# Patient Record
Sex: FEMALE | Race: WHITE | NOT HISPANIC OR LATINO | Employment: OTHER | URBAN - METROPOLITAN AREA
[De-identification: names, ages, dates, MRNs, and addresses within clinical notes are randomized per-mention and may not be internally consistent; named-entity substitution may affect disease eponyms.]

---

## 2014-05-27 LAB — HIV 1&2 EXT: NORMAL

## 2020-08-27 LAB
CREATININE (EXTERNAL): 0.73 MG/DL (ref 0.55–1.02)
GFR ESTIMATED (EXTERNAL): >60 ML/MIN
GFR ESTIMATED (IF AFRICAN AMERICAN) (EXTERNAL): >60 ML/MIN
GLUCOSE (EXTERNAL): 97 MG/DL (ref 74–106)
POTASSIUM (EXTERNAL): 4.7 MMOL/L (ref 3.5–5.1)
TSH SERPL-ACNC: 0.55 UIU/ML (ref 0.36–3.74)

## 2021-02-13 LAB
CHOLESTEROL (EXTERNAL): 207 MG/DL (ref 0–199)
HDLC SERPL-MCNC: 59 MG/DL
LDL CHOLESTEROL CALCULATED (EXTERNAL): 128 MG/DL
NON HDL CHOLESTEROL (EXTERNAL): 148 MG/DL
TRIGLYCERIDES (EXTERNAL): 99 MG/DL

## 2021-04-20 ENCOUNTER — TRANSFERRED RECORDS (OUTPATIENT)
Dept: MULTI SPECIALTY CLINIC | Facility: CLINIC | Age: 56
End: 2021-04-20

## 2021-04-20 LAB — HPV ABSTRACT: NORMAL

## 2023-07-19 ENCOUNTER — TELEPHONE (OUTPATIENT)
Dept: OBGYN | Facility: CLINIC | Age: 58
End: 2023-07-19
Payer: COMMERCIAL

## 2023-07-19 NOTE — TELEPHONE ENCOUNTER
Reason for Call:  Appointment Request    Patient requesting this type of appt:  Office visit    Requested provider: Bridgette Canales    Reason patient unable to be scheduled: Not within requested timeframe    When does patient want to be seen/preferred time: 1-2 days    Comments: patient is in menopause has not had a period in 4 years now has a period and is requesting to be seen this week with Dr. Ally Lundy to leave a detailed message?: Yes at Home number on file [phone] (home)    Call taken on 7/19/2023 at 12:28 PM by Genevieve Lopez

## 2023-07-19 NOTE — TELEPHONE ENCOUNTER
Left message for patient to return call.   Dr. Canales is not in clinic for the rest of this week, she will be back on 7/26 and she does not currently have any openings.  Patient can see any other provider in group and be placed on wait list. Soonest opening would be 8/2 at 4:30 with Dr. Alex in [place], or 8/1 with Dr. Mercer in [place]    Reyna Campoverde MA

## 2023-07-21 ENCOUNTER — NURSE TRIAGE (OUTPATIENT)
Dept: OBGYN | Facility: OTHER | Age: 58
End: 2023-07-21
Payer: COMMERCIAL

## 2023-07-21 NOTE — TELEPHONE ENCOUNTER
Reports light spotting X 5 days with mild menstrual like cramping. Denies coitus, recent procedures or injury.      Patient has appointment scheduled to be seen for post menopausal bleeding 8/01/2023.  Patient knows to call back with any new or worsening symptoms prior to appointment.  ED precautions given.     Reason for Disposition    Postmenopausal vaginal bleeding    Additional Information    Negative: Shock suspected (e.g., cold/pale/clammy skin, too weak to stand, low BP, rapid pulse)    Negative: Difficult to awaken or acting confused (e.g., disoriented, slurred speech)    Negative: Passed out (i.e., lost consciousness, collapsed and was not responding)    Negative: Sounds like a life-threatening emergency to the triager    Negative: [1] Vaginal discharge is main symptom AND [2] small amount of blood    Negative: Followed a genital area injury (e.g., vagina, vulva)    Negative: SEVERE abdominal pain    Negative: SEVERE dizziness (e.g., unable to stand, requires support to walk, feels like passing out now)    Negative: Sexual assault or rape (sexual intercourse or activity occurs without freely given consent), known or suspected    Negative: SEVERE vaginal bleeding (e.g., soaking 2 pads or tampons per hour and present 2 or more hours; 1 menstrual cup every 2 hours)    Negative: Patient sounds very sick or weak to the triager    Negative: MODERATE vaginal bleeding (e.g., soaking 1 pad or tampon per hour and present > 6 hours; 1 menstrual cup every 6 hours)    Negative: Pale skin (pallor) of new-onset or worsening    Negative: [1] Constant abdominal pain AND [2] present > 2 hours    Negative: Taking Coumadin (warfarin) or other strong blood thinner, or known bleeding disorder (e.g., thrombocytopenia)    Negative: Bleeding with > 6 soaked pads or tampons per day    Negative: Bleeding lasts for > 7 days    Negative: [1] Bleeding/spotting after procedure (e.g., biopsy) or pelvic examination (e.g., pap smear) AND  "[2] lasts > 7 days    Answer Assessment - Initial Assessment Questions  1. AMOUNT: \"Describe the bleeding that you are having.\" \"How much bleeding is there?\"     - SPOTTING: spotting, or pinkish / brownish mucous discharge; does not fill panty liner or pad     - MILD:  less than 1 pad / hour; less than patient's usual menstrual bleeding    - MODERATE: 1-2 pads / hour; 1 menstrual cup every 6 hours; small-medium blood clots (e.g., pea, grape, small coin)    - SEVERE: soaking 2 or more pads/hour for 2 or more hours; 1 menstrual cup every 2 hours; bleeding not contained by pads or continuous red blood from vagina; large blood clots (e.g., golf ball, large coin)       Spotting X 5 days    2. ONSET: \"When did the bleeding begin?\" \"Is it continuing now?\"      Monday 7/17/2023    3. MENOPAUSE: \"When was your last menstrual period?\"       3 or 4 years ago     4. ABDOMINAL PAIN: \"Do you have any pain?\" \"How bad is the pain?\"  (e.g., Scale 1-10; mild, moderate, or severe)    - MILD (1-3): doesn't interfere with normal activities, abdomen soft and not tender to touch     - MODERATE (4-7): interferes with normal activities or awakens from sleep, abdomen tender to touch     - SEVERE (8-10): excruciating pain, doubled over, unable to do any normal activities       Mild menstrual cramps    5. BLOOD THINNERS: \"Do you take any blood thinners?\" (e.g., Coumadin/warfarin, Pradaxa/dabigatran, aspirin)      No     6. HORMONES: \"Are you taking any hormone medications, prescription or OTC?\" (e.g., birth control pills, estrogen)      No     7. CAUSE: \"What do you think is causing the bleeding?\" (e.g., recent gyn surgery, recent gyn procedure; known bleeding disorder, uterine cancer)        No     8. HEMODYNAMIC STATUS: \"Are you weak or feeling lightheaded?\" If Yes, ask: \"Can you stand and walk normally?\"        Denies    9. OTHER SYMPTOMS: \"What other symptoms are you having with the bleeding?\" (e.g., back pain, burning with urination, " fever)      Increased mild fatigue    Protocols used: VAGINAL BLEEDING - [id number]

## 2023-07-21 NOTE — TELEPHONE ENCOUNTER
"[facility] Call Center    Phone Message    May a detailed message be left on voicemail: yes     Reason for Call: Other:        Sheryl is scheduled to see Dr Mercer on 8/1/23.   She reports that she is in menopause and has not had a period in 4 years.   Now she has her period.  She reports that she has googled what this could be and in concerned as \"none of the answers I am finding are good\"       Sheryl would like to speak with a nurse to make sure that it is ok for her to wait until 8/1/23 to be seen.            Action Taken: Message routed to:  [facility] [id number]    Travel Screening: Not Applicable                                                                      "

## 2023-07-21 NOTE — TELEPHONE ENCOUNTER
Unable to reach patient via phone. RN left a message and instructed patient to call the clinic at [phone].    To triage for post menopausal bleeding.    Lorrie Mcgowan RN on 7/21/2023 at 1:10 PM

## 2023-08-01 ENCOUNTER — ANCILLARY PROCEDURE (OUTPATIENT)
Dept: ULTRASOUND IMAGING | Facility: OTHER | Age: 58
End: 2023-08-01
Attending: OBSTETRICS & GYNECOLOGY
Payer: COMMERCIAL

## 2023-08-01 ENCOUNTER — OFFICE VISIT (OUTPATIENT)
Dept: OBGYN | Facility: OTHER | Age: 58
End: 2023-08-01
Payer: COMMERCIAL

## 2023-08-01 VITALS — DIASTOLIC BLOOD PRESSURE: 87 MMHG | SYSTOLIC BLOOD PRESSURE: 129 MMHG | WEIGHT: 178.57 LBS

## 2023-08-01 DIAGNOSIS — N95.0 POST-MENOPAUSAL BLEEDING: Primary | ICD-10-CM

## 2023-08-01 DIAGNOSIS — N95.0 POST-MENOPAUSAL BLEEDING: ICD-10-CM

## 2023-08-01 PROCEDURE — 76856 US EXAM PELVIC COMPLETE: CPT | Mod: TC | Performed by: RADIOLOGY

## 2023-08-01 PROCEDURE — 99203 OFFICE O/P NEW LOW 30 MIN: CPT | Performed by: OBSTETRICS & GYNECOLOGY

## 2023-08-01 PROCEDURE — 76830 TRANSVAGINAL US NON-OB: CPT | Mod: TC | Performed by: RADIOLOGY

## 2023-08-01 RX ORDER — ZINC GLUCONATE 50 MG
50 TABLET ORAL DAILY
COMMUNITY

## 2023-08-01 NOTE — PROGRESS NOTES
SUBJECTIVE:       I spent a total of 30 minutes on the care of Sheryl on the day of service including 25 minutes of face-to-face time with remainder in chart review, care coordination, documentation on the day of service.                                                Sheryl Castañeda is a 58 year old female who presents to clinic today for the following health issue(s):  No chief complaint on file.    Sheryl is a 58-year-old P2 who has been menopausal for 4 years presenting with 5 days of bleeding and then post bleeding cramping.  She notes it came on spontaneously and was light in consistency but the Cramping has persisted and tends to radiate down her left leg.    She has been menopausal for 4 years and had an uncomplicated run up to her menopause.  Her periods have always been normal.  She has not been excessively sexually active recently and none in the recent several weeks.  Significant other sounds like she is a on the  and unavailable most of the time during the summer months.    Review of systems:  She has a negative review of systems for gynecological issues no rashes, itching, burning, discharge, odors or irregular bleeding leading up to this event.  Urological review of systems is negative for frequency nocturia dysuria  GI review of systems is negative.    Medications:  Negative    Social history:  She has a substantial amount of stress most recently mostly to do with her elder child which is her daughter and then granddaughter as result of that relationship.  There is also a mother who has had some substantial bowel resection of tumor.  And then more recently something that was related to infection of the parotid gland.    Family history:  There is a history of colon cancer that runs in the family.    Past OB history:  She has had 2 kids the first was by  that is her girl she is 28 years of age.  Second child was a vaginal birth and that is her son who is 25 years of age and has not  had any significant social issues.    I drew diagrams of the uterus and explained the significance of postmenopausal bleeding and the differential diagnosis for that.  We talked about evaluation of postmenopausal bleeding and what should be done.  Suggest that we start with an ultrasound and see if there is any anatomical issues related to the uterus and depending on the endometrial lining decide on whether an endometrial sample was pertinent.  Also suggest that if we do an endometrial sample would schedule her back in the clinic in 1 week's time and take ibuprofen prior to the sample.  With the left-sided cramping in the leg issues suggest that she try 2 weeks worth of ibuprofen 600 mg at least twice a day.  She does to MyChart.    Examination is deferred    Assessment 58-year-old healthy with social stressors and now more recently postmenopausal bleeding.  She has negative risk factors for uterine issues and an uncomplicated GYN history.    Plan:  Pelvic ultrasound is pending from today  Endometrial sample would be planned depending on the end ultrasound report  Advised to try ibuprofen at least 60 mg twice a day for the next week  Follow-up in a week's time               No LMP recorded..     Patient is sexually active, No obstetric history on file..  Using menopause for contraception.    has no history on file for tobacco use.    STD testing offered?  Declined    Health maintenance updated:  no    Today's PHQ-2 Score:        No data to display              Today's PHQ-9 Score:        No data to display              Today's NE-7 Score:        No data to display                Problem list and histories reviewed & adjusted, as indicated.  Additional history: as documented.    There is no problem list on file for this patient.    No past surgical history on file.   Social History     Tobacco Use    Smoking status: Not on file    Smokeless tobacco: Not on file   Substance Use Topics    Alcohol use: Not on file       No data available              No current outpatient medications on file.     No current facility-administered medications for this visit.     Not on File            OBJECTIVE:     There were no vitals taken for this visit.  There is no height or weight on file to calculate BMI.    Exam:  See above    In-Clinic Test Results:  No results found for this or any previous visit (from the past 24 hour(s)).    ASSESSMENT/PLAN:                                                    See above    Patient Instructions                                                      If you have any questions regarding your visit, Please contact your care team.     [employer] [facility]: [phone]    To Schedule an Appointment 24/7  Call: [phone] HOURS TELEPHONE NUMBER     Christiano Mercer MD    Medical Assistant    Jacquelin Cifuentes-Surgery Scheduler  Sera-Surgery Scheduler     [place]  1:00 pm-4:30 pm    [facility]4:30 [facility]    [facility]  7:30 am-4:30 pm        Typical Surgery Days: Monday or Thursday       [address] [address]  [phone] appointment line  [phone] Fax    Imaging Scheduling all locations  [phone]    **Surgeries** Our Surgery Schedulers will contact you to schedule. If you do not receive a call within 3 business days, please call [phone].    If you need a medication refill, please contact your pharmacy. Please allow 3 business days for your refill to be completed.    As always, Thank you for trusting us with your healthcare needs!                   see additional instructions from your care team below          Christiano Mercer MD  [facility]

## 2023-08-01 NOTE — PATIENT INSTRUCTIONS
If you have any questions regarding your visit, Please contact your care team.     [email or web address] [facility]: [phone]    To Schedule an Appointment 24/7  Call: [phone] TELE[facility]ONE NUM[facility]     Christiano Mercer MD    Medical Assistant    Jacquelin Cifuentes-Surgery Scheduler  Sera-Surgery Scheduler     Monday-[place]  1:00 pm-4:30 pm    Tuesday-[place]  7:30 am-4:30 pm    Wednesday-[place]  7:30 am-4:30 pm        Typical Surgery Days: Monday or Thursday       [address] [address]  [phone] appointment line  [phone] Fax    Imaging Scheduling all locations  [phone]    **Surgeries** Our Surgery Schedulers will contact you to schedule. If you do not receive a call within 3 business days, please call [phone].    If you need a medication refill, please contact your pharmacy. Please allow 3 business days for your refill to be completed.    As always, Thank you for trusting us with your healthcare needs!                   see additional instructions from your care team below

## 2023-08-08 ENCOUNTER — OFFICE VISIT (OUTPATIENT)
Dept: OBGYN | Facility: OTHER | Age: 58
End: 2023-08-08
Payer: COMMERCIAL

## 2023-08-08 VITALS — HEART RATE: 86 BPM | WEIGHT: 178.57 LBS | DIASTOLIC BLOOD PRESSURE: 81 MMHG | SYSTOLIC BLOOD PRESSURE: 131 MMHG

## 2023-08-08 DIAGNOSIS — N95.0 POST-MENOPAUSAL BLEEDING: Primary | ICD-10-CM

## 2023-08-08 PROCEDURE — 88305 TISSUE EXAM BY PATHOLOGIST: CPT | Performed by: PATHOLOGY

## 2023-08-08 PROCEDURE — 58100 BIOPSY OF UTERUS LINING: CPT | Performed by: OBSTETRICS & GYNECOLOGY

## 2023-08-08 NOTE — PROGRESS NOTES
INDICATIONS:                                                      Sheryl is a 58 year old P2 who has an US with 7 mm lining and post menopausal bleeding.  Suggested to have an endometrial biopsy and plan/options discussed.  We discussed the option of waiting and having an US in 6 months to reevaluate the lining of the uterus, Having a D and C hysteroscopy and third option of being on progesterone for the next 6 months followed by US.  She already has cramping (planning for a colonoscopy).  With the cramps I'd be less likely to suggest progesterone.    Is a pregnancy test required: No.  Was a consent obtained?  Yes    Having endometrial biopsy for post-menopausal bleeding and thickened endometrium     Today's PHQ-2 Score:       8/1/2023     8:35 AM   PHQ-2 ( 1999 [employer])   Q1: Little interest or pleasure in doing things 0   Q2: Feeling down, depressed or hopeless 0   PHQ-2 Score 0       PROCEDURE;                                                      A speculum was placed in the vagina and cervix prepped with betadine. A tenaculum was attached to the cervix. A small plastic 5 mm Pipelle syringe curette was inserted into the cervical canal. The uterus was sounded to 8 cm's. A vigorous four quadrant biopsy was performed, removing amount moderate  of tissue. The speculum was removed. This tissue was placed in Formalin and sent to pathology.    The patient tolerated the procedure  fairly well and she reported there was  cramping.      POST PROCEDURE;                                                      There  was  cramping at the time of discharge. She  tolerated the procedure well. There were no complications. Patient was discharged in stable condition.    Patient advised to call the clinic if severe pelvic pain, fever or heavy bleeding.    Christiano Mercer MD

## 2023-08-08 NOTE — PATIENT INSTRUCTIONS
If you have any questions regarding your visit, Please contact your care team.     [employer] [facility]: [phone]    To Schedule an Appointment 24/7  Call: [phone] HOURS TELEPHONE NUMBER     Christiano Mercer MD    Medical Assistant    Jacquelin Cifuentes-Surgery Scheduler  Sera-Surgery Scheduler     Monday-[place]  1:00 pm-4:30 pm    Tuesday-[place]  7:30 am-4:30 pm    Wednesday-[place]  7:30 am-4:30 pm        Typical Surgery Days: Monday or Thursday       [address] [address]  [phone] appointment line  [phone] Fax    Imaging Scheduling all locations  [phone]    **Surgeries** Our Surgery Schedulers will contact you to schedule. If you do not receive a call within 3 business days, please call [phone].    If you need a medication refill, please contact your pharmacy. Please allow 3 business days for your refill to be completed.    As always, Thank you for trusting us with your healthcare needs!                   see additional instructions from your care team below

## 2023-08-11 LAB
PATH REPORT.COMMENTS IMP SPEC: NORMAL
PATH REPORT.COMMENTS IMP SPEC: NORMAL
PATH REPORT.FINAL DX SPEC: NORMAL
PATH REPORT.GROSS SPEC: NORMAL
PATH REPORT.MICROSCOPIC SPEC OTHER STN: NORMAL
PATH REPORT.RELEVANT HX SPEC: NORMAL
PHOTO IMAGE: NORMAL

## 2023-08-15 ENCOUNTER — HOSPITAL ENCOUNTER (OUTPATIENT)
Dept: MAMMOGRAPHY | Facility: CLINIC | Age: 58
Discharge: HOME | End: 2023-08-15
Attending: OBSTETRICS & GYNECOLOGY | Admitting: OBSTETRICS & GYNECOLOGY
Payer: COMMERCIAL

## 2023-08-15 DIAGNOSIS — Z12.31 VISIT FOR SCREENING MAMMOGRAM: ICD-10-CM

## 2023-08-15 PROCEDURE — 77067 SCR MAMMO BI INCL CAD: CPT

## 2023-08-21 ENCOUNTER — TELEPHONE (OUTPATIENT)
Dept: OBGYN | Facility: OTHER | Age: 58
End: 2023-08-21
Payer: COMMERCIAL

## 2023-08-21 NOTE — TELEPHONE ENCOUNTER
Patient called earlier today to speak to Dr. Hendricks RN, was initially transferred and patient said she was disconnected.     Returning call requesting to be connected again. Informed patient the team is currently busy but will return her call when they are able.      Routing encounter to OB team. Please reach out to patient when available.

## 2023-08-21 NOTE — TELEPHONE ENCOUNTER
"Pt last seen 8/8/2023 for PMB- pt completed EMB: \" With the lining being a little thicker than normal I'd suggest having a repeat US in 6 months.  I put in an order for that already.  Another option is to have a D and C hysteroscopy to clean out the extra thickness. It's your call.  I don't think there is any harm in waiting and if you have odd bleeding again in the future, that would be a good enough reason to have the D and C. \"    Unable to reach patient via phone. RN left a message and instructed patient to call the clinic at [phone].    Irma Birch RN on 8/21/2023 at 3:08 PM    "

## 2023-08-22 NOTE — TELEPHONE ENCOUNTER
"Spoke with pt.  She has questions about her EMB results from 8/8.  Per EMB result note:  \"The endometrial sample looks just fine.  We talked about what to do in the future.  With the lining being a little thicker than normal I'd suggest having a repeat US in 6 months.  I put in an order for that already.  Another option is to have a D and C hysteroscopy to clean out the extra thickness. It's your call.  I don't think there is any harm in waiting and if you have odd bleeding again in the future, that would be a good enough reason to have the D and C.\"    Pt states she was contacted to schedule an US but pt states she already did the US.  I told her that based on Dr. Mercer's result note he had suggested to repeat the US in 6 months.  Pt is wanting to know what she can do now for her symptoms.  She states she is experiencing lower abdominal cramping and feels pregnant.  I mentioned that Dr. Mercer suggested she do an hysteroscopy D&C to clean out the extra thickness to see if that helps her symptoms.  I also suggested that her symptoms could be related to something other than gynecologic in nature.      Pt will think about what she wants to do and get back to us.  She does have a colonoscopy scheduled for September.    Shelby Marquez RN    "

## 2023-08-27 ENCOUNTER — HEALTH MAINTENANCE LETTER (OUTPATIENT)
Age: 58
End: 2023-08-27

## 2023-09-05 ENCOUNTER — TRANSFERRED RECORDS (OUTPATIENT)
Dept: HEALTH INFORMATION MANAGEMENT | Facility: CLINIC | Age: 58
End: 2023-09-05
Payer: COMMERCIAL

## 2023-11-28 ENCOUNTER — VIRTUAL VISIT (OUTPATIENT)
Dept: URGENT CARE | Facility: CLINIC | Age: 58
End: 2023-11-28
Payer: COMMERCIAL

## 2023-11-28 ENCOUNTER — NURSE TRIAGE (OUTPATIENT)
Dept: FAMILY MEDICINE | Facility: CLINIC | Age: 58
End: 2023-11-28

## 2023-11-28 DIAGNOSIS — H92.01 EARACHE ON RIGHT: Primary | ICD-10-CM

## 2023-11-28 PROCEDURE — 99207 PR NON-BILLABLE SERV PER CHARTING: CPT | Performed by: EMERGENCY MEDICINE

## 2023-11-28 NOTE — TELEPHONE ENCOUNTER
Nurse Triage SBAR    Is this a 2nd Level Triage? YES, LICENSED PRACTITIONER REVIEW IS REQUIRED    Situation: Patient calling to report she has had a cold for a few weeks and now she is having very bad ear pain. No fevers. No shortness of breath. Some mild light headedness that she believes it is from the ear pain.    Background: 3 weeks of congestions and cold symptoms    Assessment: Patient has bad ear pain that started last night. She tried to go to urgent care last night and there was 3 hour wait. She went this morning and it was again 2-3 hours. Patient was scheduled for a virtual visit today as there are no openings in clinic until tomorrow.     Protocol Recommended Disposition:   Go To Office Now    Recommendation: Virtual visit set up for patient today         Does the patient meet one of the following criteria for ADS visit consideration? No    MILLIE Knowles, RN        Reason for Disposition   Severe earache pain    Additional Information   Negative: Sounds like a life-threatening emergency to the triager   Negative: Moving the earlobe or touching the ear clearly increases the pain   Negative: Foreign body stuck in the ear (e.g., bug, piece of cotton)   Negative: Pink or red swelling behind the ear   Negative: Stiff neck (can't touch chin to chest)   Negative: Patient sounds very sick or weak to the triager    Protocols used: Earache-A-[place]

## 2023-11-28 NOTE — PROGRESS NOTES
Phone appointment:  Duration: 6 minutes.        CHIEF COMPLAINT: Right ear pain.      HPI: Patient is a 58-year-old female whose had recent URI symptoms who developed left ear pain on Sunday which seem to get better but yesterday her right ear started hurting.  She does not notice change in her hearing.  There is some tenderness to touch of that ear.  No chronic ear disease per se.  She does not have any sinus infection symptoms at this time.  See HPI otherwise normal.      ROS: See HPI otherwise negative    Allergies   Allergen Reactions     Bupropion Other (See Comments)     Chest pain and left arm numbness    Chest pain and left arm numbness   Chest pain and left arm numbness     Kiwi Extract      Other Reaction(s): Throat Irritation    Other reaction(s): Throat Irritation      Current Outpatient Medications   Medication Sig Dispense Refill     MULTIPLE VITAMIN PO        VITAMIN D PO Take 5,000-10,000 Units by mouth daily.       zinc gluconate 50 MG tablet Take 50 mg by mouth daily           PE: No acute distress on phone appointment.  She is alert and oriented.  No dysphonia.  Nondyspneic sounding speaking in full sentences.        TREATMENT: None.      ASSESSMENT: Right ear pain of uncertain etiology.  Differential diagnosis includes otitis externa, otitis media versus eustachian tube dysfunction.  Feel best that patient be seen in person and I have directed her to urgent care in the morning.      DIAGNOSIS: Right ear pain      PLAN: Patient is to come to urgent care for Mi in the morning for evaluation.

## 2024-03-24 ENCOUNTER — HEALTH MAINTENANCE LETTER (OUTPATIENT)
Age: 59
End: 2024-03-24

## 2025-04-13 ENCOUNTER — HEALTH MAINTENANCE LETTER (OUTPATIENT)
Age: 60
End: 2025-04-13